# Patient Record
Sex: FEMALE | Race: OTHER
[De-identification: names, ages, dates, MRNs, and addresses within clinical notes are randomized per-mention and may not be internally consistent; named-entity substitution may affect disease eponyms.]

---

## 2020-06-23 ENCOUNTER — HOSPITAL ENCOUNTER (OUTPATIENT)
Dept: HOSPITAL 74 - JER | Age: 25
Setting detail: OBSERVATION
LOS: 4 days | Discharge: ANOTHER HOSPITAL | End: 2020-06-27
Attending: INTERNAL MEDICINE | Admitting: INTERNAL MEDICINE
Payer: COMMERCIAL

## 2020-06-23 VITALS — BODY MASS INDEX: 22.3 KG/M2

## 2020-06-23 DIAGNOSIS — Z91.19: ICD-10-CM

## 2020-06-23 DIAGNOSIS — Z72.0: ICD-10-CM

## 2020-06-23 DIAGNOSIS — F11.23: ICD-10-CM

## 2020-06-23 DIAGNOSIS — D72.829: ICD-10-CM

## 2020-06-23 DIAGNOSIS — Z29.9: ICD-10-CM

## 2020-06-23 DIAGNOSIS — I16.0: Primary | ICD-10-CM

## 2020-06-23 DIAGNOSIS — F19.10: ICD-10-CM

## 2020-06-23 PROCEDURE — U0003 INFECTIOUS AGENT DETECTION BY NUCLEIC ACID (DNA OR RNA); SEVERE ACUTE RESPIRATORY SYNDROME CORONAVIRUS 2 (SARS-COV-2) (CORONAVIRUS DISEASE [COVID-19]), AMPLIFIED PROBE TECHNIQUE, MAKING USE OF HIGH THROUGHPUT TECHNOLOGIES AS DESCRIBED BY CMS-2020-01-R: HCPCS

## 2020-06-23 PROCEDURE — G0378 HOSPITAL OBSERVATION PER HR: HCPCS

## 2020-06-24 LAB
ALBUMIN SERPL-MCNC: 4.4 G/DL (ref 3.4–5)
ALBUMIN SERPL-MCNC: 4.9 G/DL (ref 3.4–5)
ALP SERPL-CCNC: 112 U/L (ref 45–117)
ALP SERPL-CCNC: 127 U/L (ref 45–117)
ALT SERPL-CCNC: 19 U/L (ref 13–61)
ALT SERPL-CCNC: 19 U/L (ref 13–61)
ANION GAP SERPL CALC-SCNC: 12 MMOL/L (ref 8–16)
ANION GAP SERPL CALC-SCNC: 13 MMOL/L (ref 8–16)
AST SERPL-CCNC: 12 U/L (ref 15–37)
AST SERPL-CCNC: 16 U/L (ref 15–37)
BASOPHILS # BLD: 0.2 % (ref 0–2)
BASOPHILS # BLD: 0.2 % (ref 0–2)
BILIRUB SERPL-MCNC: 0.7 MG/DL (ref 0.2–1)
BILIRUB SERPL-MCNC: 0.7 MG/DL (ref 0.2–1)
BUN SERPL-MCNC: 5.6 MG/DL (ref 7–18)
BUN SERPL-MCNC: 6.1 MG/DL (ref 7–18)
CALCIUM SERPL-MCNC: 10 MG/DL (ref 8.5–10.1)
CALCIUM SERPL-MCNC: 9.2 MG/DL (ref 8.5–10.1)
CHLORIDE SERPL-SCNC: 94 MMOL/L (ref 98–107)
CHLORIDE SERPL-SCNC: 98 MMOL/L (ref 98–107)
CHOLEST SERPL-MCNC: 201 MG/DL (ref 50–200)
CO2 SERPL-SCNC: 23 MMOL/L (ref 21–32)
CO2 SERPL-SCNC: 26 MMOL/L (ref 21–32)
CREAT SERPL-MCNC: 0.8 MG/DL (ref 0.55–1.3)
CREAT SERPL-MCNC: 0.8 MG/DL (ref 0.55–1.3)
DEPRECATED RDW RBC AUTO: 14.8 % (ref 11.6–15.6)
DEPRECATED RDW RBC AUTO: 14.8 % (ref 11.6–15.6)
EOSINOPHIL # BLD: 0 % (ref 0–4.5)
EOSINOPHIL # BLD: 0 % (ref 0–4.5)
GLUCOSE SERPL-MCNC: 119 MG/DL (ref 74–106)
GLUCOSE SERPL-MCNC: 151 MG/DL (ref 74–106)
HCT VFR BLD CALC: 41.8 % (ref 32.4–45.2)
HCT VFR BLD CALC: 46.5 % (ref 32.4–45.2)
HDLC SERPL-MCNC: 80 MG/DL (ref 40–60)
HGB BLD-MCNC: 13.9 GM/DL (ref 10.7–15.3)
HGB BLD-MCNC: 15.6 GM/DL (ref 10.7–15.3)
HIV 1+2 AB+HIV1 P24 AG SERPL QL IA: NEGATIVE
LDLC SERPL CALC-MCNC: 110 MG/DL (ref 5–100)
LIPASE SERPL-CCNC: 86 U/L (ref 73–393)
LYMPHOCYTES # BLD: 10.2 % (ref 8–40)
LYMPHOCYTES # BLD: 10.4 % (ref 8–40)
MAGNESIUM SERPL-MCNC: 2.1 MG/DL (ref 1.8–2.4)
MCH RBC QN AUTO: 28 PG (ref 25.7–33.7)
MCH RBC QN AUTO: 28.3 PG (ref 25.7–33.7)
MCHC RBC AUTO-ENTMCNC: 33.3 G/DL (ref 32–36)
MCHC RBC AUTO-ENTMCNC: 33.5 G/DL (ref 32–36)
MCV RBC: 84.1 FL (ref 80–96)
MCV RBC: 84.5 FL (ref 80–96)
MONOCYTES # BLD AUTO: 4.2 % (ref 3.8–10.2)
MONOCYTES # BLD AUTO: 6.9 % (ref 3.8–10.2)
NEUTROPHILS # BLD: 82.7 % (ref 42.8–82.8)
NEUTROPHILS # BLD: 85.2 % (ref 42.8–82.8)
PHOSPHATE SERPL-MCNC: 3.3 MG/DL (ref 2.5–4.9)
PLATELET # BLD AUTO: 262 K/MM3 (ref 134–434)
PLATELET # BLD AUTO: 281 K/MM3 (ref 134–434)
PMV BLD: 9.4 FL (ref 7.5–11.1)
PMV BLD: 9.9 FL (ref 7.5–11.1)
POTASSIUM SERPLBLD-SCNC: 3.3 MMOL/L (ref 3.5–5.1)
POTASSIUM SERPLBLD-SCNC: 3.5 MMOL/L (ref 3.5–5.1)
PROT SERPL-MCNC: 8.1 G/DL (ref 6.4–8.2)
PROT SERPL-MCNC: 9.5 G/DL (ref 6.4–8.2)
RBC # BLD AUTO: 4.97 M/MM3 (ref 3.6–5.2)
RBC # BLD AUTO: 5.51 M/MM3 (ref 3.6–5.2)
SODIUM SERPL-SCNC: 132 MMOL/L (ref 136–145)
SODIUM SERPL-SCNC: 133 MMOL/L (ref 136–145)
TRIGL SERPL-MCNC: 62 MG/DL (ref 0–150)
WBC # BLD AUTO: 10.2 K/MM3 (ref 4–10)
WBC # BLD AUTO: 11.3 K/MM3 (ref 4–10)

## 2020-06-24 PROCEDURE — 3E0337Z INTRODUCTION OF ELECTROLYTIC AND WATER BALANCE SUBSTANCE INTO PERIPHERAL VEIN, PERCUTANEOUS APPROACH: ICD-10-PCS | Performed by: INTERNAL MEDICINE

## 2020-06-24 PROCEDURE — 3E033GC INTRODUCTION OF OTHER THERAPEUTIC SUBSTANCE INTO PERIPHERAL VEIN, PERCUTANEOUS APPROACH: ICD-10-PCS | Performed by: INTERNAL MEDICINE

## 2020-06-24 PROCEDURE — 3E023GC INTRODUCTION OF OTHER THERAPEUTIC SUBSTANCE INTO MUSCLE, PERCUTANEOUS APPROACH: ICD-10-PCS | Performed by: INTERNAL MEDICINE

## 2020-06-24 RX ADMIN — FOLIC ACID SCH MG: 1 TABLET ORAL at 11:02

## 2020-06-24 RX ADMIN — ACETAMINOPHEN PRN MG: 500 TABLET, FILM COATED ORAL at 14:31

## 2020-06-24 RX ADMIN — POTASSIUM CHLORIDE SCH: 7.46 INJECTION, SOLUTION INTRAVENOUS at 10:00

## 2020-06-24 RX ADMIN — ENOXAPARIN SODIUM SCH MG: 40 INJECTION SUBCUTANEOUS at 11:02

## 2020-06-24 RX ADMIN — MULTIVITAMIN TABLET SCH TAB: TABLET at 11:02

## 2020-06-24 RX ADMIN — Medication SCH MG: at 11:02

## 2020-06-24 RX ADMIN — POTASSIUM CHLORIDE SCH MLS/HR: 7.46 INJECTION, SOLUTION INTRAVENOUS at 08:20

## 2020-06-24 RX ADMIN — AMLODIPINE BESYLATE SCH MG: 10 TABLET ORAL at 14:31

## 2020-06-24 RX ADMIN — POTASSIUM CHLORIDE SCH: 7.46 INJECTION, SOLUTION INTRAVENOUS at 09:40

## 2020-06-24 RX ADMIN — FAMOTIDINE PRN MG: 20 TABLET ORAL at 18:38

## 2020-06-25 LAB
ALBUMIN SERPL-MCNC: 4.4 G/DL (ref 3.4–5)
ALP SERPL-CCNC: 110 U/L (ref 45–117)
ALT SERPL-CCNC: 17 U/L (ref 13–61)
ANION GAP SERPL CALC-SCNC: 9 MMOL/L (ref 8–16)
ANISOCYTOSIS BLD QL: 0
AST SERPL-CCNC: 13 U/L (ref 15–37)
BILIRUB SERPL-MCNC: 1.2 MG/DL (ref 0.2–1)
BUN SERPL-MCNC: 11.3 MG/DL (ref 7–18)
CALCIUM SERPL-MCNC: 9.1 MG/DL (ref 8.5–10.1)
CHLORIDE SERPL-SCNC: 95 MMOL/L (ref 98–107)
CO2 SERPL-SCNC: 24 MMOL/L (ref 21–32)
CREAT SERPL-MCNC: 0.9 MG/DL (ref 0.55–1.3)
DEPRECATED RDW RBC AUTO: 14.5 % (ref 11.6–15.6)
GLUCOSE SERPL-MCNC: 147 MG/DL (ref 74–106)
HCT VFR BLD CALC: 45.4 % (ref 32.4–45.2)
HGB BLD-MCNC: 14.9 GM/DL (ref 10.7–15.3)
MACROCYTES BLD QL: 0
MAGNESIUM SERPL-MCNC: 2.7 MG/DL (ref 1.8–2.4)
MCH RBC QN AUTO: 28 PG (ref 25.7–33.7)
MCHC RBC AUTO-ENTMCNC: 32.8 G/DL (ref 32–36)
MCV RBC: 85.4 FL (ref 80–96)
PHOSPHATE SERPL-MCNC: 3.2 MG/DL (ref 2.5–4.9)
PLATELET # BLD AUTO: 257 K/MM3 (ref 134–434)
PLATELET BLD QL SMEAR: NORMAL
PMV BLD: 9.4 FL (ref 7.5–11.1)
POTASSIUM SERPLBLD-SCNC: 3.2 MMOL/L (ref 3.5–5.1)
PROT SERPL-MCNC: 8.1 G/DL (ref 6.4–8.2)
RBC # BLD AUTO: 5.31 M/MM3 (ref 3.6–5.2)
SODIUM SERPL-SCNC: 128 MMOL/L (ref 136–145)
WBC # BLD AUTO: 12.9 K/MM3 (ref 4–10)

## 2020-06-25 RX ADMIN — ACETAMINOPHEN PRN MG: 500 TABLET, FILM COATED ORAL at 17:56

## 2020-06-25 RX ADMIN — SODIUM CHLORIDE SCH MLS/HR: 9 INJECTION, SOLUTION INTRAVENOUS at 09:23

## 2020-06-25 RX ADMIN — ONDANSETRON PRN MG: 2 INJECTION INTRAMUSCULAR; INTRAVENOUS at 06:39

## 2020-06-25 RX ADMIN — ENOXAPARIN SODIUM SCH MG: 40 INJECTION SUBCUTANEOUS at 10:45

## 2020-06-25 RX ADMIN — Medication SCH MG: at 11:25

## 2020-06-25 RX ADMIN — HYDRALAZINE HYDROCHLORIDE SCH MG: 25 TABLET, FILM COATED ORAL at 22:13

## 2020-06-25 RX ADMIN — FAMOTIDINE PRN MG: 20 TABLET ORAL at 02:29

## 2020-06-25 RX ADMIN — MULTIVITAMIN TABLET SCH TAB: TABLET at 11:25

## 2020-06-25 RX ADMIN — Medication SCH MG: at 22:05

## 2020-06-25 RX ADMIN — ACETAMINOPHEN PRN MG: 500 TABLET, FILM COATED ORAL at 02:29

## 2020-06-25 RX ADMIN — LISINOPRIL SCH MG: 20 TABLET ORAL at 15:52

## 2020-06-25 RX ADMIN — AMLODIPINE BESYLATE SCH MG: 10 TABLET ORAL at 10:45

## 2020-06-25 RX ADMIN — FOLIC ACID SCH MG: 1 TABLET ORAL at 10:45

## 2020-06-25 RX ADMIN — ONDANSETRON PRN MG: 2 INJECTION INTRAMUSCULAR; INTRAVENOUS at 22:50

## 2020-06-26 LAB
ALBUMIN SERPL-MCNC: 4.1 G/DL (ref 3.4–5)
ALP SERPL-CCNC: 102 U/L (ref 45–117)
ALT SERPL-CCNC: 15 U/L (ref 13–61)
AMPHET UR-MCNC: NEGATIVE NG/ML
ANION GAP SERPL CALC-SCNC: 11 MMOL/L (ref 8–16)
APPEARANCE UR: (no result)
AST SERPL-CCNC: 10 U/L (ref 15–37)
BARBITURATES UR-MCNC: NEGATIVE NG/ML
BASOPHILS # BLD: 0.4 % (ref 0–2)
BENZODIAZ UR SCN-MCNC: NEGATIVE NG/ML
BILIRUB SERPL-MCNC: 1.5 MG/DL (ref 0.2–1)
BILIRUB UR STRIP.AUTO-MCNC: NEGATIVE MG/DL
BUN SERPL-MCNC: 8.9 MG/DL (ref 7–18)
CALCIUM SERPL-MCNC: 8.6 MG/DL (ref 8.5–10.1)
CHLORIDE SERPL-SCNC: 98 MMOL/L (ref 98–107)
CO2 SERPL-SCNC: 22 MMOL/L (ref 21–32)
COCAINE UR-MCNC: NEGATIVE NG/ML
COLOR UR: YELLOW
CREAT SERPL-MCNC: 0.7 MG/DL (ref 0.55–1.3)
DEPRECATED RDW RBC AUTO: 14.6 % (ref 11.6–15.6)
EOSINOPHIL # BLD: 0.1 % (ref 0–4.5)
GLUCOSE SERPL-MCNC: 108 MG/DL (ref 74–106)
HCT VFR BLD CALC: 43.7 % (ref 32.4–45.2)
HGB BLD-MCNC: 14.6 GM/DL (ref 10.7–15.3)
KETONES UR QL STRIP: (no result)
LEUKOCYTE ESTERASE UR QL STRIP.AUTO: NEGATIVE
LYMPHOCYTES # BLD: 21.9 % (ref 8–40)
MAGNESIUM SERPL-MCNC: 2.4 MG/DL (ref 1.8–2.4)
MCH RBC QN AUTO: 28.4 PG (ref 25.7–33.7)
MCHC RBC AUTO-ENTMCNC: 33.5 G/DL (ref 32–36)
MCV RBC: 84.7 FL (ref 80–96)
METHADONE UR-MCNC: POSITIVE NG/ML
MONOCYTES # BLD AUTO: 9 % (ref 3.8–10.2)
NEUTROPHILS # BLD: 68.6 % (ref 42.8–82.8)
NITRITE UR QL STRIP: NEGATIVE
OPIATES UR QL SCN: NEGATIVE NG/ML
PCP UR QL SCN: NEGATIVE NG/ML
PH UR: 7.5 [PH] (ref 5–8)
PHOSPHATE SERPL-MCNC: 2.4 MG/DL (ref 2.5–4.9)
PLATELET # BLD AUTO: 242 K/MM3 (ref 134–434)
PMV BLD: 9.4 FL (ref 7.5–11.1)
POTASSIUM SERPLBLD-SCNC: 3.7 MMOL/L (ref 3.5–5.1)
PROT SERPL-MCNC: 7.4 G/DL (ref 6.4–8.2)
PROT UR QL STRIP: NEGATIVE
PROT UR QL STRIP: NEGATIVE
RBC # BLD AUTO: 5.15 M/MM3 (ref 3.6–5.2)
SODIUM SERPL-SCNC: 131 MMOL/L (ref 136–145)
SP GR UR: 1.01 (ref 1.01–1.03)
UROBILINOGEN UR STRIP-MCNC: 1 MG/DL (ref 0.2–1)
WBC # BLD AUTO: 10.4 K/MM3 (ref 4–10)

## 2020-06-26 RX ADMIN — FOLIC ACID SCH MG: 1 TABLET ORAL at 10:17

## 2020-06-26 RX ADMIN — HYDROXYZINE PAMOATE PRN MG: 25 CAPSULE ORAL at 10:24

## 2020-06-26 RX ADMIN — Medication SCH MG: at 10:18

## 2020-06-26 RX ADMIN — ENOXAPARIN SODIUM SCH MG: 40 INJECTION SUBCUTANEOUS at 10:17

## 2020-06-26 RX ADMIN — LISINOPRIL SCH MG: 20 TABLET ORAL at 10:17

## 2020-06-26 RX ADMIN — HYDROXYZINE PAMOATE PRN MG: 25 CAPSULE ORAL at 18:29

## 2020-06-26 RX ADMIN — AMLODIPINE BESYLATE SCH MG: 10 TABLET ORAL at 10:17

## 2020-06-26 RX ADMIN — HYDROXYZINE PAMOATE PRN MG: 25 CAPSULE ORAL at 00:23

## 2020-06-26 RX ADMIN — SODIUM CHLORIDE SCH: 9 INJECTION, SOLUTION INTRAVENOUS at 10:24

## 2020-06-26 RX ADMIN — MULTIVITAMIN TABLET SCH TAB: TABLET at 10:17

## 2020-06-26 RX ADMIN — ACETAMINOPHEN PRN MG: 500 TABLET, FILM COATED ORAL at 05:43

## 2020-06-26 RX ADMIN — HYDRALAZINE HYDROCHLORIDE SCH MG: 25 TABLET, FILM COATED ORAL at 14:04

## 2020-06-26 RX ADMIN — HYDRALAZINE HYDROCHLORIDE SCH MG: 25 TABLET, FILM COATED ORAL at 22:30

## 2020-06-26 RX ADMIN — METHOCARBAMOL PRN MG: 500 TABLET ORAL at 18:29

## 2020-06-26 RX ADMIN — Medication SCH MG: at 22:30

## 2020-06-26 RX ADMIN — HYDRALAZINE HYDROCHLORIDE SCH MG: 25 TABLET, FILM COATED ORAL at 05:36

## 2020-06-26 RX ADMIN — HYDROXYZINE PAMOATE PRN MG: 25 CAPSULE ORAL at 04:27

## 2020-06-27 ENCOUNTER — HOSPITAL ENCOUNTER (INPATIENT)
Dept: HOSPITAL 74 - YASAS | Age: 25
LOS: 2 days | Discharge: HOME | DRG: 773 | End: 2020-06-29
Attending: ALLERGY & IMMUNOLOGY | Admitting: ALLERGY & IMMUNOLOGY
Payer: COMMERCIAL

## 2020-06-27 VITALS — BODY MASS INDEX: 23.1 KG/M2

## 2020-06-27 VITALS — SYSTOLIC BLOOD PRESSURE: 101 MMHG | HEART RATE: 93 BPM | DIASTOLIC BLOOD PRESSURE: 60 MMHG

## 2020-06-27 VITALS — TEMPERATURE: 98 F

## 2020-06-27 DIAGNOSIS — R11.10: ICD-10-CM

## 2020-06-27 DIAGNOSIS — Z86.73: ICD-10-CM

## 2020-06-27 DIAGNOSIS — J45.909: ICD-10-CM

## 2020-06-27 DIAGNOSIS — F11.23: Primary | ICD-10-CM

## 2020-06-27 DIAGNOSIS — R10.9: ICD-10-CM

## 2020-06-27 DIAGNOSIS — F17.213: ICD-10-CM

## 2020-06-27 DIAGNOSIS — I10: ICD-10-CM

## 2020-06-27 LAB
ALBUMIN SERPL-MCNC: 3.8 G/DL (ref 3.4–5)
ALP SERPL-CCNC: 110 U/L (ref 45–117)
ALT SERPL-CCNC: 16 U/L (ref 13–61)
ANION GAP SERPL CALC-SCNC: 9 MMOL/L (ref 8–16)
AST SERPL-CCNC: 13 U/L (ref 15–37)
BILIRUB CONJ SERPL-MCNC: 0.2 MG/DL (ref 0–0.2)
BILIRUB SERPL-MCNC: 0.7 MG/DL (ref 0.2–1)
BUN SERPL-MCNC: 14 MG/DL (ref 7–18)
CALCIUM SERPL-MCNC: 8.7 MG/DL (ref 8.5–10.1)
CHLORIDE SERPL-SCNC: 102 MMOL/L (ref 98–107)
CO2 SERPL-SCNC: 22 MMOL/L (ref 21–32)
CREAT SERPL-MCNC: 1 MG/DL (ref 0.55–1.3)
GLUCOSE SERPL-MCNC: 89 MG/DL (ref 74–106)
POTASSIUM SERPLBLD-SCNC: 3.5 MMOL/L (ref 3.5–5.1)
PROT SERPL-MCNC: 6.9 G/DL (ref 6.4–8.2)
SODIUM SERPL-SCNC: 133 MMOL/L (ref 136–145)

## 2020-06-27 PROCEDURE — HZ2ZZZZ DETOXIFICATION SERVICES FOR SUBSTANCE ABUSE TREATMENT: ICD-10-PCS | Performed by: ALLERGY & IMMUNOLOGY

## 2020-06-27 RX ADMIN — FOLIC ACID SCH MG: 1 TABLET ORAL at 09:15

## 2020-06-27 RX ADMIN — HYDRALAZINE HYDROCHLORIDE SCH MG: 25 TABLET, FILM COATED ORAL at 06:19

## 2020-06-27 RX ADMIN — ACETAMINOPHEN PRN MG: 500 TABLET, FILM COATED ORAL at 12:57

## 2020-06-27 RX ADMIN — Medication SCH MG: at 22:22

## 2020-06-27 RX ADMIN — HYDRALAZINE HYDROCHLORIDE SCH MG: 25 TABLET, FILM COATED ORAL at 22:24

## 2020-06-27 RX ADMIN — AMLODIPINE BESYLATE SCH MG: 10 TABLET ORAL at 09:16

## 2020-06-27 RX ADMIN — ENOXAPARIN SODIUM SCH MG: 40 INJECTION SUBCUTANEOUS at 09:16

## 2020-06-27 RX ADMIN — LISINOPRIL SCH MG: 20 TABLET ORAL at 09:17

## 2020-06-27 RX ADMIN — METHOCARBAMOL PRN MG: 500 TABLET ORAL at 03:17

## 2020-06-27 RX ADMIN — Medication SCH MG: at 09:17

## 2020-06-27 RX ADMIN — MULTIVITAMIN TABLET SCH TAB: TABLET at 09:17

## 2020-06-27 RX ADMIN — METHOCARBAMOL PRN MG: 500 TABLET ORAL at 18:20
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
## 2020-06-28 LAB
ALBUMIN SERPL-MCNC: 4.2 G/DL (ref 3.4–5)
ALP SERPL-CCNC: 118 U/L (ref 45–117)
ALT SERPL-CCNC: 18 U/L (ref 13–61)
ANION GAP SERPL CALC-SCNC: 10 MMOL/L (ref 8–16)
AST SERPL-CCNC: 16 U/L (ref 15–37)
BILIRUB SERPL-MCNC: 0.7 MG/DL (ref 0.2–1)
BUN SERPL-MCNC: 11.3 MG/DL (ref 7–18)
CALCIUM SERPL-MCNC: 8.8 MG/DL (ref 8.5–10.1)
CHLORIDE SERPL-SCNC: 101 MMOL/L (ref 98–107)
CO2 SERPL-SCNC: 23 MMOL/L (ref 21–32)
CREAT SERPL-MCNC: 0.8 MG/DL (ref 0.55–1.3)
DEPRECATED RDW RBC AUTO: 14.3 % (ref 11.6–15.6)
GLUCOSE SERPL-MCNC: 123 MG/DL (ref 74–106)
HCT VFR BLD CALC: 42.8 % (ref 32.4–45.2)
HGB BLD-MCNC: 14.1 GM/DL (ref 10.7–15.3)
MCH RBC QN AUTO: 28.4 PG (ref 25.7–33.7)
MCHC RBC AUTO-ENTMCNC: 33 G/DL (ref 32–36)
MCV RBC: 86 FL (ref 80–96)
PLATELET # BLD AUTO: 280 K/MM3 (ref 134–434)
PMV BLD: 9.6 FL (ref 7.5–11.1)
POTASSIUM SERPLBLD-SCNC: 3.7 MMOL/L (ref 3.5–5.1)
PROT SERPL-MCNC: 7.4 G/DL (ref 6.4–8.2)
RBC # BLD AUTO: 4.97 M/MM3 (ref 3.6–5.2)
SODIUM SERPL-SCNC: 134 MMOL/L (ref 136–145)
WBC # BLD AUTO: 9.4 K/MM3 (ref 4–10)

## 2020-06-28 RX ADMIN — HYDRALAZINE HYDROCHLORIDE SCH MG: 25 TABLET, FILM COATED ORAL at 22:46

## 2020-06-28 RX ADMIN — FAMOTIDINE SCH: 20 TABLET ORAL at 11:38

## 2020-06-28 RX ADMIN — METHOCARBAMOL PRN MG: 500 TABLET ORAL at 21:37

## 2020-06-28 RX ADMIN — Medication SCH MG: at 22:46

## 2020-06-28 RX ADMIN — HYDRALAZINE HYDROCHLORIDE SCH: 25 TABLET, FILM COATED ORAL at 08:25

## 2020-06-28 RX ADMIN — HYDRALAZINE HYDROCHLORIDE SCH MG: 25 TABLET, FILM COATED ORAL at 13:27

## 2020-06-28 RX ADMIN — HYDRALAZINE HYDROCHLORIDE SCH: 25 TABLET, FILM COATED ORAL at 06:48

## 2020-06-28 RX ADMIN — NICOTINE SCH: 7 PATCH TRANSDERMAL at 09:30

## 2020-06-28 RX ADMIN — LISINOPRIL SCH MG: 20 TABLET ORAL at 09:07

## 2020-06-28 RX ADMIN — AMLODIPINE BESYLATE SCH MG: 10 TABLET ORAL at 09:07

## 2020-06-28 RX ADMIN — Medication SCH: at 09:30

## 2020-06-28 RX ADMIN — ACETAMINOPHEN PRN MG: 325 TABLET ORAL at 23:38

## 2020-06-28 RX ADMIN — ACETAMINOPHEN PRN MG: 325 TABLET ORAL at 02:36

## 2020-06-28 RX ADMIN — FAMOTIDINE SCH MG: 20 TABLET ORAL at 22:46
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
## 2020-06-28 NOTE — PN
[facility] COWS





- Scale


Resting Pulse: 2= -120


Sweatin= No chills or Flushing


Restless Observation: 0= Sits Still


Pupil Size: 0= Normal to Room Light


Bone or Joint Aches: 0= None


Runny Nose/ Eye Tearin= None


GI Upset > 30mins: 5=Frequent Vomit/Diarrhea


Tremor Observation of Outstretched Hands: 0= None


Yawning Observation: 0= None


Anxiety or Irritability: 0= None


Goose Flesh Skin: 0=Smooth Skin


COWS Score: 7





[facility] Progress Note (SOAP)


Subjective: 





apresoline not given around 7 am today


bp 150/98


one dose of apresoline now





25 years old female transferred from medical unit to opiate detox unit 


admitted on 20 for opiate withdrawal sx management treating with methadone

detox regiment


Ms trejo was treated at medical unit for fever abdominal pain and bp elevation 

from 20 to 20 


                               Vital Signs - 24 hr











  20





  14:09 16:36 20:50


 


Temperature 97.1 F L 97.7 F 97.8 F


 


Pulse Rate 94 H 114 H 104 H


 


Respiratory 16 16 16





Rate   


 


Blood Pressure 97/58 L 106/68 102/69


 


O2 Sat by Pulse   100





Oximetry (%)   














  20





  00:30 03:30 06:24


 


Temperature   97.6 F


 


Pulse Rate   103 H


 


Respiratory 18 18 18





Rate   


 


Blood Pressure   153/93


 


O2 Sat by Pulse   99





Oximetry (%)   














  20





  08:35


 


Temperature 97.5 F L


 


Pulse Rate 118 H


 


Respiratory 18





Rate 


 


Blood Pressure 150/98


 


O2 Sat by Pulse 





Oximetry (%) 








bp elevation 


resume apresoline amlodipin lisinopril 


discontinue motrin


pepecid 20 mg po bid initiated


carafate 1 gram x 1 around 2 pm today 


Objective: 





20 11:27


lab see Medical unit result


detox lab pending


Assessment: 





20 11:28


opiate withdrawal 


Plan: 





methadone regiment

## 2020-06-29 VITALS — SYSTOLIC BLOOD PRESSURE: 126 MMHG | DIASTOLIC BLOOD PRESSURE: 87 MMHG | TEMPERATURE: 97.5 F | HEART RATE: 108 BPM

## 2020-06-29 RX ADMIN — HYDRALAZINE HYDROCHLORIDE SCH: 25 TABLET, FILM COATED ORAL at 06:55

## 2020-06-29 RX ADMIN — FAMOTIDINE SCH MG: 20 TABLET ORAL at 09:51

## 2020-06-29 RX ADMIN — Medication SCH TAB: at 09:50

## 2020-06-29 RX ADMIN — LISINOPRIL SCH MG: 20 TABLET ORAL at 09:50

## 2020-06-29 RX ADMIN — AMLODIPINE BESYLATE SCH MG: 10 TABLET ORAL at 09:50

## 2020-06-29 RX ADMIN — NICOTINE SCH: 7 PATCH TRANSDERMAL at 09:51

## 2020-06-29 NOTE — DS
[facility] Detox Discharge Summary


Admission Date: 


06/27/20





Discharge Date: 06/29/20





- History


Present History: Opioid Dependence


Additional Comments: 





25 years old female admitted on 06/27/20 for opiate withdrawal sx management 

treated with methadone detox regiment 


ms trejo does not wish to be seen by a psychiatrist "they are not giving me 

anything"


ms trejo was treated at medical unit for abdominal pain and vomiting from 06/23/20

to 06/27/20


medically stabled to [facility] for opiate withdrawal sx management


ms trejo has completed the methadone regiment and is tolerated well


ms trejo experienced abdominal pain and vomiting throughout the opiate detox 


toradol for abdominal pain management and tigan IM for vomiting


ms trejo is alert oriented x 3 ambulating steady gaits from room to nurse station 

to counselor's room 


                               Vital Signs - 24 hr











  06/28/20 06/28/20 06/28/20





  12:40 16:50 20:59


 


Temperature 97.7 F 97.5 F L 99.5 F


 


Pulse Rate 104 H 104 H 123 H


 


Respiratory 18 16 16





Rate   


 


Blood Pressure 157/95 127/70 157/94


 


O2 Sat by Pulse 100  96





Oximetry (%)   














  06/28/20 06/29/20 06/29/20





  22:40 05:55 08:42


 


Temperature 96.9 F L 98.0 F 97.5 F L


 


Pulse Rate  96 H 108 H


 


Respiratory  18 16





Rate   


 


Blood Pressure  100/69 126/87


 


O2 Sat by Pulse  98 





Oximetry (%)   








denies dizziness denies  palpitation 


oral mucosa pink moist speech clearly coherently 


ms prefers to stay in detox or transferred to [facility] continue toradol for 

pain and tigan for vomiting


suggesting return to medical unit for abdominal pain and vomiting re evaluation 


ms trejo prefers to go to opiate recovery rehab facility today 


counselor presents an opportunity of [facility] in patient opiate recovery 


ms trejo agrees to go to [facility] transportation around 1030 today





ms trejo presents that [facility] is better place for her


treatment team met with the patient discussing continuity of care as key for 

opiate recovery regardless [facility]





cardiac s1s2  sinus tachycardia


ekg indicated biatrial enlargement


ms denies chest pain no dizziness no shortness of breath 


discussing smoking cessation 


respiratory clear lung sounds bilaterally on auscultation


extremities full range of motion 





Pertinent Past History: 





time for discharge 58 minutes








- Physical Exam Results


Vital Signs: 


                                   Vital Signs











Temperature  97.5 F L  06/29/20 08:42


 


Pulse Rate  108 H  06/29/20 08:42


 


Respiratory Rate  16   06/29/20 08:42


 


Blood Pressure  126/87   06/29/20 08:42


 


O2 Sat by Pulse Oximetry (%)  98   06/29/20 05:55











Pertinent Admission Physical Exam Findings: 





opiate withdrawal 


                                Laboratory Tests











  06/28/20 06/28/20





  07:30 07:30


 


WBC  9.4 


 


RBC  4.97 


 


Hgb  14.1 


 


Hct  42.8 


 


MCV  86.0 


 


MCH  28.4 


 


MCHC  33.0 


 


RDW  14.3 


 


Plt Count  280 


 


MPV  9.6 


 


Sodium   134 L


 


Potassium   3.7


 


Chloride   101


 


Carbon Dioxide   23


 


Anion Gap   10


 


BUN   11.3


 


Creatinine   0.8


 


Est GFR (CKD-EPI)AfAm   117.93


 


Est GFR (CKD-EPI)NonAf   101.75


 


Random Glucose   123 H


 


Calcium   8.8


 


Total Bilirubin   0.7


 


AST   16


 


ALT   18


 


Alkaline Phosphatase   118 H


 


Total Protein   7.4


 


Albumin   4.2














- Treatment


Hospital Course: Detox Protocol Followed, Detoxed Safely (prolong qtc tolerted 

methadone well throughout the detox process), Responded well (discussing 

medication assisted treatment program  picking up narcan from pharmacy upon 

discharge from in patient substance abuse facility), Discharged Condition Good 

(less nausea vomiting less frequent abdomen pain  ms trejo prefers to stay in 

detox  [facility] for [facility] 

[facility]), Rehab Referral Accepted (suggesting ms trejo return to ER for further 

abdominal pain and nausea vomiting re evaluation   ms trejo prefers to stay in [facility] continue toradol treatment  ms trejo agrees to go to [facility] 

inpatient opiate rehab transportation  around 1030 am today ms trejo voice 

aftercare preference to [facility] discussing the continuity of care as opiate

abuse recovery)


[facility]





- Medication


Discharge Medications: 


Ambulatory Orders





Acetaminophen [Tylenol .Extra-Strength -] 1,000 mg PO Q6H PRN  tablet 06/26/20 


Amlodipine Besylate [Norvasc -] 10 mg PO DAILY  tablet 06/26/20 


Bismuth Subsalicylate [Pepto-Bismol -] 524 mg PO Q1H PRN  ud 06/26/20 


Famotidine [Pepcid -] 20 mg PO DAILY PRN  tablet 06/26/20 


Folic Acid - 1 mg PO DAILY  tablet 06/26/20 


Ibuprofen [Motrin -] 400 mg PO Q6H PRN  tablet 06/26/20 


Lisinopril [Prinivil] 40 mg PO DAILY  tablet 06/26/20 


Mag Hydrox/Al Hydrox/Simeth [Mylanta Oral Suspension -] 30 ml PO Q6H PRN  cup 

06/26/20 


Magnesium Citrate [Citroma -] 300 ml PO Q48H PRN  bottle 06/26/20 


Magnesium Hydrox 2400MG/30Ml [Milk of Magnesia -] 30 ml PO PRN PRN  cup 06/26/20




Melatonin 5 mg PO HS  tab 06/26/20 


Menthol/Phenol [Cepastat Lozenge -] 1 each MM Q4H PRN  ud 06/26/20 


Methocarbamol [Robaxin -] 500 mg PO Q6H PRN  tablet 06/26/20 


Multivitamins [Multivit ([facility] Formulary)] 1 tab PO DAILY  tab 06/26/20 


Prenatal Vitamins ([facility]) - 1 tab PO DAILY  tablet 06/26/20 


Thiamine HCl [Vitamin B1 -] 100 mg PO DAILY  tablet 06/26/20 


hydrALAZINE HCL [Apresoline -] 25 mg PO TID  tablet 06/26/20 


hydrOXYzine PAMOATE [Vistaril -] 25 mg PO Q4HWA  capsule 06/26/20 


Naloxone HCl [Narcan] 4 mg NS ASDIR PRN #1 spray 06/28/20 











- Diagnosis


(1) Abdominal pain with vomiting


Status: Chronic   





(2) HTN (hypertension)


Status: Chronic   


Qualifiers: 


   Hypertension type: essential hypertension   Qualified Code(s): I10 - 

Essential (primary) hypertension   





(3) Heroin withdrawal


Status: Acute   





(4) Nicotine addiction


Status: Acute   


Qualifiers: 


   Nicotine product type: cigarettes   Substance use status: in withdrawal   

Qualified Code(s): F17.213 - Nicotine dependence, cigarettes, with withdrawal   





- AMA


Did Patient Leave Against Medical Advice: No

## 2021-03-18 ENCOUNTER — HOSPITAL ENCOUNTER (EMERGENCY)
Dept: HOSPITAL 74 - JER | Age: 26
Discharge: ANOTHER HOSPITAL | End: 2021-03-18
Payer: COMMERCIAL

## 2021-03-18 ENCOUNTER — HOSPITAL ENCOUNTER (INPATIENT)
Dept: HOSPITAL 74 - YASAS | Age: 26
LOS: 5 days | Discharge: OTHER INSTITUTION | DRG: 773 | End: 2021-03-23
Attending: ALLERGY & IMMUNOLOGY | Admitting: ALLERGY & IMMUNOLOGY
Payer: COMMERCIAL

## 2021-03-18 VITALS — BODY MASS INDEX: 23.1 KG/M2

## 2021-03-18 VITALS — TEMPERATURE: 98.9 F | DIASTOLIC BLOOD PRESSURE: 105 MMHG | HEART RATE: 90 BPM | SYSTOLIC BLOOD PRESSURE: 173 MMHG

## 2021-03-18 VITALS — BODY MASS INDEX: 22.4 KG/M2

## 2021-03-18 DIAGNOSIS — Z56.0: ICD-10-CM

## 2021-03-18 DIAGNOSIS — F31.9: ICD-10-CM

## 2021-03-18 DIAGNOSIS — J45.909: ICD-10-CM

## 2021-03-18 DIAGNOSIS — I10: ICD-10-CM

## 2021-03-18 DIAGNOSIS — E86.0: Primary | ICD-10-CM

## 2021-03-18 DIAGNOSIS — R00.0: ICD-10-CM

## 2021-03-18 DIAGNOSIS — F41.9: ICD-10-CM

## 2021-03-18 DIAGNOSIS — R42: ICD-10-CM

## 2021-03-18 DIAGNOSIS — R07.89: ICD-10-CM

## 2021-03-18 DIAGNOSIS — F11.23: Primary | ICD-10-CM

## 2021-03-18 DIAGNOSIS — D72.829: ICD-10-CM

## 2021-03-18 DIAGNOSIS — F17.210: ICD-10-CM

## 2021-03-18 DIAGNOSIS — E87.6: ICD-10-CM

## 2021-03-18 DIAGNOSIS — F11.90: ICD-10-CM

## 2021-03-18 DIAGNOSIS — Z91.410: ICD-10-CM

## 2021-03-18 DIAGNOSIS — F25.9: ICD-10-CM

## 2021-03-18 LAB
ALBUMIN SERPL-MCNC: 5 G/DL (ref 3.4–5)
ALP SERPL-CCNC: 151 U/L (ref 45–117)
ALT SERPL-CCNC: 18 U/L (ref 13–61)
ANION GAP SERPL CALC-SCNC: 12 MMOL/L (ref 8–16)
APPEARANCE UR: (no result)
AST SERPL-CCNC: 8 U/L (ref 15–37)
BACTERIA # UR AUTO: 2944.4 /UL (ref 0–1359)
BASOPHILS # BLD: 0.4 % (ref 0–2)
BILIRUB SERPL-MCNC: 0.7 MG/DL (ref 0.2–1)
BILIRUB UR STRIP.AUTO-MCNC: NEGATIVE MG/DL
BUN SERPL-MCNC: 8.2 MG/DL (ref 7–18)
CALCIUM SERPL-MCNC: 10.5 MG/DL (ref 8.5–10.1)
CASTS URNS QL MICRO: 4.88 /UL (ref 0–3.1)
CHLORIDE SERPL-SCNC: 95 MMOL/L (ref 98–107)
CO2 SERPL-SCNC: 30 MMOL/L (ref 21–32)
COLOR UR: YELLOW
CREAT SERPL-MCNC: 0.8 MG/DL (ref 0.55–1.3)
DEPRECATED RDW RBC AUTO: 14.1 % (ref 11.6–15.6)
EOSINOPHIL # BLD: 0 % (ref 0–4.5)
EPITH CASTS URNS QL MICRO: 117.2 /UL (ref 0–25.1)
GLUCOSE SERPL-MCNC: 139 MG/DL (ref 74–106)
HCT VFR BLD CALC: 45.5 % (ref 32.4–45.2)
HGB BLD-MCNC: 15.4 GM/DL (ref 10.7–15.3)
KETONES UR QL STRIP: (no result)
LEUKOCYTE ESTERASE UR QL STRIP.AUTO: NEGATIVE
LYMPHOCYTES # BLD: 8.6 % (ref 8–40)
MAGNESIUM SERPL-MCNC: 2.8 MG/DL (ref 1.8–2.4)
MCH RBC QN AUTO: 28 PG (ref 25.7–33.7)
MCHC RBC AUTO-ENTMCNC: 33.8 G/DL (ref 32–36)
MCV RBC: 82.9 FL (ref 80–96)
MONOCYTES # BLD AUTO: 4.4 % (ref 3.8–10.2)
NEUTROPHILS # BLD: 86.6 % (ref 42.8–82.8)
NITRITE UR QL STRIP: NEGATIVE
PH UR: 6.5 [PH] (ref 5–8)
PLATELET # BLD AUTO: 340 K/MM3 (ref 134–434)
PMV BLD: 9 FL (ref 7.5–11.1)
POTASSIUM SERPLBLD-SCNC: 3.1 MMOL/L (ref 3.5–5.1)
PROT SERPL-MCNC: 9.8 G/DL (ref 6.4–8.2)
PROT UR QL STRIP: (no result)
PROT UR QL STRIP: NEGATIVE
RBC # BLD AUTO: 23.8 /UL (ref 0–23.9)
RBC # BLD AUTO: 5.49 M/MM3 (ref 3.6–5.2)
SODIUM SERPL-SCNC: 137 MMOL/L (ref 136–145)
SP GR UR: 1.03 (ref 1.01–1.03)
UROBILINOGEN UR STRIP-MCNC: 0.2 MG/DL (ref 0.2–1)
WBC # BLD AUTO: 13.8 K/MM3 (ref 4–10)
WBC # UR AUTO: 140.2 /UL (ref 0–25.8)

## 2021-03-18 PROCEDURE — HZ2ZZZZ DETOXIFICATION SERVICES FOR SUBSTANCE ABUSE TREATMENT: ICD-10-PCS | Performed by: ALLERGY & IMMUNOLOGY

## 2021-03-18 PROCEDURE — C9803 HOPD COVID-19 SPEC COLLECT: HCPCS

## 2021-03-18 PROCEDURE — U0003 INFECTIOUS AGENT DETECTION BY NUCLEIC ACID (DNA OR RNA); SEVERE ACUTE RESPIRATORY SYNDROME CORONAVIRUS 2 (SARS-COV-2) (CORONAVIRUS DISEASE [COVID-19]), AMPLIFIED PROBE TECHNIQUE, MAKING USE OF HIGH THROUGHPUT TECHNOLOGIES AS DESCRIBED BY CMS-2020-01-R: HCPCS

## 2021-03-18 RX ADMIN — HYDROXYZINE PAMOATE SCH MG: 25 CAPSULE ORAL at 17:09

## 2021-03-18 RX ADMIN — HYDROXYZINE PAMOATE SCH MG: 25 CAPSULE ORAL at 21:01

## 2021-03-18 RX ADMIN — NICOTINE SCH: 14 PATCH, EXTENDED RELEASE TRANSDERMAL at 18:16

## 2021-03-18 RX ADMIN — Medication SCH MG: at 21:01

## 2021-03-18 RX ADMIN — Medication SCH: at 18:16

## 2021-03-18 RX ADMIN — METHOCARBAMOL PRN MG: 500 TABLET ORAL at 17:09

## 2021-03-18 RX ADMIN — HYDRALAZINE HYDROCHLORIDE SCH MG: 25 TABLET, FILM COATED ORAL at 22:05

## 2021-03-18 SDOH — ECONOMIC STABILITY - INCOME SECURITY: UNEMPLOYMENT, UNSPECIFIED: Z56.0

## 2021-03-19 RX ADMIN — IBUPROFEN PRN MG: 400 TABLET, FILM COATED ORAL at 07:23

## 2021-03-19 RX ADMIN — HYDROXYZINE PAMOATE SCH MG: 25 CAPSULE ORAL at 18:36

## 2021-03-19 RX ADMIN — HYDROXYZINE PAMOATE SCH MG: 25 CAPSULE ORAL at 05:42

## 2021-03-19 RX ADMIN — HYDROXYZINE PAMOATE SCH MG: 25 CAPSULE ORAL at 22:14

## 2021-03-19 RX ADMIN — HYDRALAZINE HYDROCHLORIDE SCH MG: 25 TABLET, FILM COATED ORAL at 05:42

## 2021-03-19 RX ADMIN — METHOCARBAMOL PRN MG: 500 TABLET ORAL at 09:21

## 2021-03-19 RX ADMIN — ONDANSETRON PRN MG: 4 TABLET, ORALLY DISINTEGRATING ORAL at 14:28

## 2021-03-19 RX ADMIN — ONDANSETRON PRN MG: 4 TABLET, ORALLY DISINTEGRATING ORAL at 03:56

## 2021-03-19 RX ADMIN — AMLODIPINE BESYLATE SCH MG: 10 TABLET ORAL at 09:20

## 2021-03-19 RX ADMIN — HYDROXYZINE PAMOATE SCH MG: 25 CAPSULE ORAL at 14:15

## 2021-03-19 RX ADMIN — METHOCARBAMOL PRN MG: 500 TABLET ORAL at 19:59

## 2021-03-19 RX ADMIN — NICOTINE SCH MG: 14 PATCH, EXTENDED RELEASE TRANSDERMAL at 09:21

## 2021-03-19 RX ADMIN — LISINOPRIL SCH MG: 20 TABLET ORAL at 10:40

## 2021-03-19 RX ADMIN — Medication SCH MG: at 22:14

## 2021-03-19 RX ADMIN — Medication SCH TAB: at 09:20

## 2021-03-19 RX ADMIN — HYDRALAZINE HYDROCHLORIDE SCH MG: 25 TABLET, FILM COATED ORAL at 22:14

## 2021-03-19 RX ADMIN — IBUPROFEN PRN MG: 400 TABLET, FILM COATED ORAL at 14:15

## 2021-03-19 RX ADMIN — HYDRALAZINE HYDROCHLORIDE SCH: 25 TABLET, FILM COATED ORAL at 14:24

## 2021-03-19 RX ADMIN — METHOCARBAMOL PRN MG: 500 TABLET ORAL at 14:56

## 2021-03-19 RX ADMIN — HYDROXYZINE PAMOATE SCH MG: 25 CAPSULE ORAL at 09:20

## 2021-03-20 RX ADMIN — AMLODIPINE BESYLATE SCH MG: 10 TABLET ORAL at 10:15

## 2021-03-20 RX ADMIN — LIDOCAINE SCH PATCH: 50 PATCH TOPICAL at 15:33

## 2021-03-20 RX ADMIN — HYDRALAZINE HYDROCHLORIDE SCH MG: 25 TABLET, FILM COATED ORAL at 14:28

## 2021-03-20 RX ADMIN — HYDROXYZINE PAMOATE SCH MG: 25 CAPSULE ORAL at 14:29

## 2021-03-20 RX ADMIN — IBUPROFEN PRN MG: 400 TABLET, FILM COATED ORAL at 13:07

## 2021-03-20 RX ADMIN — HYDROXYZINE PAMOATE SCH MG: 25 CAPSULE ORAL at 23:13

## 2021-03-20 RX ADMIN — Medication SCH MG: at 23:13

## 2021-03-20 RX ADMIN — HYDRALAZINE HYDROCHLORIDE SCH MG: 25 TABLET, FILM COATED ORAL at 23:12

## 2021-03-20 RX ADMIN — LISINOPRIL SCH MG: 20 TABLET ORAL at 10:15

## 2021-03-20 RX ADMIN — NICOTINE SCH MG: 14 PATCH, EXTENDED RELEASE TRANSDERMAL at 10:15

## 2021-03-20 RX ADMIN — HYDRALAZINE HYDROCHLORIDE SCH: 25 TABLET, FILM COATED ORAL at 16:18

## 2021-03-20 RX ADMIN — HYDROXYZINE PAMOATE SCH MG: 25 CAPSULE ORAL at 10:16

## 2021-03-20 RX ADMIN — HYDROXYZINE PAMOATE SCH MG: 25 CAPSULE ORAL at 05:32

## 2021-03-20 RX ADMIN — Medication SCH TAB: at 10:14

## 2021-03-20 RX ADMIN — HYDROXYZINE PAMOATE SCH: 25 CAPSULE ORAL at 19:34

## 2021-03-20 RX ADMIN — Medication SCH EACH: at 23:13

## 2021-03-20 RX ADMIN — METHOCARBAMOL PRN MG: 500 TABLET ORAL at 19:22

## 2021-03-20 RX ADMIN — TRIMETHOBENZAMIDE HYDROCHLORIDE PRN MG: 100 INJECTION INTRAMUSCULAR at 07:34

## 2021-03-20 RX ADMIN — ONDANSETRON PRN MG: 4 TABLET, ORALLY DISINTEGRATING ORAL at 01:07

## 2021-03-20 RX ADMIN — TRIMETHOBENZAMIDE HYDROCHLORIDE PRN MG: 100 INJECTION INTRAMUSCULAR at 17:26

## 2021-03-20 RX ADMIN — Medication SCH MG: at 23:14

## 2021-03-21 LAB
BASOPHILS # BLD: 0.3 % (ref 0–2)
DEPRECATED RDW RBC AUTO: 14.2 % (ref 11.6–15.6)
EOSINOPHIL # BLD: 0.5 % (ref 0–4.5)
HCT VFR BLD CALC: 47.3 % (ref 32.4–45.2)
HGB BLD-MCNC: 15.7 GM/DL (ref 10.7–15.3)
LYMPHOCYTES # BLD: 44.3 % (ref 8–40)
MCH RBC QN AUTO: 28 PG (ref 25.7–33.7)
MCHC RBC AUTO-ENTMCNC: 33.2 G/DL (ref 32–36)
MCV RBC: 84.4 FL (ref 80–96)
MONOCYTES # BLD AUTO: 7.1 % (ref 3.8–10.2)
NEUTROPHILS # BLD: 47.8 % (ref 42.8–82.8)
PLATELET # BLD AUTO: 337 K/MM3 (ref 134–434)
PMV BLD: 9.5 FL (ref 7.5–11.1)
RBC # BLD AUTO: 5.61 M/MM3 (ref 3.6–5.2)
WBC # BLD AUTO: 9.5 K/MM3 (ref 4–10)

## 2021-03-21 RX ADMIN — Medication SCH: at 23:34

## 2021-03-21 RX ADMIN — ATENOLOL SCH MG: 25 TABLET ORAL at 15:04

## 2021-03-21 RX ADMIN — ONDANSETRON PRN MG: 4 TABLET, ORALLY DISINTEGRATING ORAL at 17:37

## 2021-03-21 RX ADMIN — HYDRALAZINE HYDROCHLORIDE SCH MG: 25 TABLET, FILM COATED ORAL at 07:07

## 2021-03-21 RX ADMIN — HYDROXYZINE PAMOATE SCH MG: 25 CAPSULE ORAL at 10:12

## 2021-03-21 RX ADMIN — Medication SCH EACH: at 22:54

## 2021-03-21 RX ADMIN — Medication SCH MG: at 22:57

## 2021-03-21 RX ADMIN — NICOTINE SCH: 14 PATCH, EXTENDED RELEASE TRANSDERMAL at 10:11

## 2021-03-21 RX ADMIN — ATENOLOL SCH MG: 25 TABLET ORAL at 15:41

## 2021-03-21 RX ADMIN — LISINOPRIL SCH MG: 20 TABLET ORAL at 10:12

## 2021-03-21 RX ADMIN — HYDROXYZINE PAMOATE SCH MG: 25 CAPSULE ORAL at 07:12

## 2021-03-21 RX ADMIN — LIDOCAINE SCH PATCH: 50 PATCH TOPICAL at 10:11

## 2021-03-21 RX ADMIN — FAMOTIDINE SCH MG: 20 TABLET ORAL at 10:12

## 2021-03-21 RX ADMIN — TRIMETHOBENZAMIDE HYDROCHLORIDE PRN MG: 100 INJECTION INTRAMUSCULAR at 19:25

## 2021-03-21 RX ADMIN — HYDROXYZINE PAMOATE SCH MG: 25 CAPSULE ORAL at 22:56

## 2021-03-21 RX ADMIN — HYDROXYZINE PAMOATE SCH: 25 CAPSULE ORAL at 15:19

## 2021-03-21 RX ADMIN — HYDRALAZINE HYDROCHLORIDE SCH MG: 25 TABLET, FILM COATED ORAL at 22:55

## 2021-03-21 RX ADMIN — FAMOTIDINE SCH MG: 20 TABLET ORAL at 22:55

## 2021-03-21 RX ADMIN — ATENOLOL SCH MG: 25 TABLET ORAL at 22:55

## 2021-03-21 RX ADMIN — HYDROXYZINE PAMOATE SCH: 25 CAPSULE ORAL at 19:16

## 2021-03-21 RX ADMIN — Medication SCH TAB: at 10:12

## 2021-03-21 RX ADMIN — HYDRALAZINE HYDROCHLORIDE SCH MG: 25 TABLET, FILM COATED ORAL at 15:04

## 2021-03-21 RX ADMIN — AMLODIPINE BESYLATE SCH MG: 10 TABLET ORAL at 10:12

## 2021-03-22 RX ADMIN — NICOTINE SCH: 14 PATCH, EXTENDED RELEASE TRANSDERMAL at 10:44

## 2021-03-22 RX ADMIN — ATENOLOL SCH MG: 25 TABLET ORAL at 10:44

## 2021-03-22 RX ADMIN — FAMOTIDINE SCH MG: 20 TABLET ORAL at 23:11

## 2021-03-22 RX ADMIN — HYDROXYZINE PAMOATE SCH MG: 25 CAPSULE ORAL at 14:23

## 2021-03-22 RX ADMIN — HYDROXYZINE PAMOATE SCH MG: 25 CAPSULE ORAL at 18:20

## 2021-03-22 RX ADMIN — FAMOTIDINE SCH MG: 20 TABLET ORAL at 10:44

## 2021-03-22 RX ADMIN — HYDRALAZINE HYDROCHLORIDE SCH MG: 25 TABLET, FILM COATED ORAL at 23:11

## 2021-03-22 RX ADMIN — AMLODIPINE BESYLATE SCH MG: 10 TABLET ORAL at 10:44

## 2021-03-22 RX ADMIN — Medication SCH TAB: at 10:44

## 2021-03-22 RX ADMIN — HYDRALAZINE HYDROCHLORIDE SCH MG: 25 TABLET, FILM COATED ORAL at 14:23

## 2021-03-22 RX ADMIN — HYDROXYZINE PAMOATE SCH MG: 25 CAPSULE ORAL at 07:10

## 2021-03-22 RX ADMIN — Medication SCH MG: at 23:11

## 2021-03-22 RX ADMIN — HYDROXYZINE PAMOATE SCH MG: 25 CAPSULE ORAL at 23:12

## 2021-03-22 RX ADMIN — LIDOCAINE SCH PATCH: 50 PATCH TOPICAL at 10:44

## 2021-03-22 RX ADMIN — LISINOPRIL SCH MG: 20 TABLET ORAL at 10:44

## 2021-03-22 RX ADMIN — Medication SCH EACH: at 23:12

## 2021-03-22 RX ADMIN — HYDROXYZINE PAMOATE SCH MG: 25 CAPSULE ORAL at 10:45

## 2021-03-22 RX ADMIN — ATENOLOL SCH MG: 25 TABLET ORAL at 23:11

## 2021-03-22 RX ADMIN — HYDRALAZINE HYDROCHLORIDE SCH MG: 25 TABLET, FILM COATED ORAL at 07:10

## 2021-03-23 VITALS — TEMPERATURE: 97.8 F | DIASTOLIC BLOOD PRESSURE: 84 MMHG | SYSTOLIC BLOOD PRESSURE: 149 MMHG | HEART RATE: 83 BPM

## 2021-03-23 RX ADMIN — METHOCARBAMOL PRN MG: 500 TABLET ORAL at 18:10

## 2021-03-23 RX ADMIN — ATENOLOL SCH MG: 25 TABLET ORAL at 12:33

## 2021-03-23 RX ADMIN — LISINOPRIL SCH MG: 20 TABLET ORAL at 12:30

## 2021-03-23 RX ADMIN — NICOTINE SCH: 14 PATCH, EXTENDED RELEASE TRANSDERMAL at 12:31

## 2021-03-23 RX ADMIN — Medication SCH TAB: at 12:30

## 2021-03-23 RX ADMIN — FAMOTIDINE SCH MG: 20 TABLET ORAL at 12:30

## 2021-03-23 RX ADMIN — HYDRALAZINE HYDROCHLORIDE SCH MG: 25 TABLET, FILM COATED ORAL at 07:13

## 2021-03-23 RX ADMIN — LIDOCAINE SCH: 50 PATCH TOPICAL at 12:31

## 2021-03-23 RX ADMIN — HYDROXYZINE PAMOATE SCH: 25 CAPSULE ORAL at 15:05

## 2021-03-23 RX ADMIN — HYDROXYZINE PAMOATE SCH: 25 CAPSULE ORAL at 12:37

## 2021-03-23 RX ADMIN — AMLODIPINE BESYLATE SCH MG: 10 TABLET ORAL at 12:31

## 2021-03-23 RX ADMIN — HYDROXYZINE PAMOATE SCH MG: 25 CAPSULE ORAL at 07:14

## 2021-03-23 RX ADMIN — HYDROXYZINE PAMOATE SCH MG: 25 CAPSULE ORAL at 18:10

## 2021-03-23 RX ADMIN — HYDRALAZINE HYDROCHLORIDE SCH: 25 TABLET, FILM COATED ORAL at 15:06

## 2021-03-24 ENCOUNTER — HOSPITAL ENCOUNTER (INPATIENT)
Dept: HOSPITAL 74 - YASAS | Age: 26
LOS: 1 days | Discharge: LEFT BEFORE DISCHARGE | DRG: 770 | End: 2021-03-25
Attending: ALLERGY & IMMUNOLOGY | Admitting: ALLERGY & IMMUNOLOGY
Payer: COMMERCIAL

## 2021-03-24 VITALS — BODY MASS INDEX: 21.9 KG/M2

## 2021-03-24 VITALS — TEMPERATURE: 96.8 F

## 2021-03-24 DIAGNOSIS — F32.9: ICD-10-CM

## 2021-03-24 DIAGNOSIS — I10: ICD-10-CM

## 2021-03-24 DIAGNOSIS — F11.20: Primary | ICD-10-CM

## 2021-03-24 DIAGNOSIS — F25.9: ICD-10-CM

## 2021-03-24 DIAGNOSIS — F41.9: ICD-10-CM

## 2021-03-24 DIAGNOSIS — J45.909: ICD-10-CM

## 2021-03-24 DIAGNOSIS — R63.4: ICD-10-CM

## 2021-03-24 DIAGNOSIS — F17.210: ICD-10-CM

## 2021-03-24 DIAGNOSIS — K21.9: ICD-10-CM

## 2021-03-24 PROCEDURE — C9803 HOPD COVID-19 SPEC COLLECT: HCPCS

## 2021-03-24 PROCEDURE — HZ42ZZZ GROUP COUNSELING FOR SUBSTANCE ABUSE TREATMENT, COGNITIVE-BEHAVIORAL: ICD-10-PCS | Performed by: ALLERGY & IMMUNOLOGY

## 2021-03-24 PROCEDURE — U0003 INFECTIOUS AGENT DETECTION BY NUCLEIC ACID (DNA OR RNA); SEVERE ACUTE RESPIRATORY SYNDROME CORONAVIRUS 2 (SARS-COV-2) (CORONAVIRUS DISEASE [COVID-19]), AMPLIFIED PROBE TECHNIQUE, MAKING USE OF HIGH THROUGHPUT TECHNOLOGIES AS DESCRIBED BY CMS-2020-01-R: HCPCS

## 2021-03-24 RX ADMIN — Medication SCH: at 23:50

## 2021-03-24 RX ADMIN — HYDROXYZINE PAMOATE SCH: 25 CAPSULE ORAL at 23:50

## 2021-03-24 RX ADMIN — HYDROXYZINE PAMOATE SCH: 25 CAPSULE ORAL at 17:38

## 2021-03-24 RX ADMIN — HYDRALAZINE HYDROCHLORIDE SCH: 25 TABLET, FILM COATED ORAL at 23:50

## 2021-03-24 RX ADMIN — ATENOLOL SCH: 25 TABLET ORAL at 23:50

## 2021-03-25 VITALS — HEART RATE: 102 BPM | DIASTOLIC BLOOD PRESSURE: 70 MMHG | SYSTOLIC BLOOD PRESSURE: 119 MMHG

## 2021-03-25 LAB
ALBUMIN SERPL-MCNC: 4 G/DL (ref 3.4–5)
ALP SERPL-CCNC: 132 U/L (ref 45–117)
ALT SERPL-CCNC: 19 U/L (ref 13–61)
ANION GAP SERPL CALC-SCNC: 6 MMOL/L (ref 8–16)
AST SERPL-CCNC: 10 U/L (ref 15–37)
BILIRUB SERPL-MCNC: 1.2 MG/DL (ref 0.2–1)
BUN SERPL-MCNC: 6.7 MG/DL (ref 7–18)
CALCIUM SERPL-MCNC: 9.1 MG/DL (ref 8.5–10.1)
CHLORIDE SERPL-SCNC: 100 MMOL/L (ref 98–107)
CO2 SERPL-SCNC: 26 MMOL/L (ref 21–32)
CREAT SERPL-MCNC: 0.7 MG/DL (ref 0.55–1.3)
DEPRECATED RDW RBC AUTO: 14.4 % (ref 11.6–15.6)
GLUCOSE SERPL-MCNC: 114 MG/DL (ref 74–106)
HCT VFR BLD CALC: 42.2 % (ref 32.4–45.2)
HGB BLD-MCNC: 14.1 GM/DL (ref 10.7–15.3)
MCH RBC QN AUTO: 28.2 PG (ref 25.7–33.7)
MCHC RBC AUTO-ENTMCNC: 33.5 G/DL (ref 32–36)
MCV RBC: 84.3 FL (ref 80–96)
PLATELET # BLD AUTO: 316 K/MM3 (ref 134–434)
PMV BLD: 9 FL (ref 7.5–11.1)
POTASSIUM SERPLBLD-SCNC: 4.3 MMOL/L (ref 3.5–5.1)
PROT SERPL-MCNC: 7.2 G/DL (ref 6.4–8.2)
RBC # BLD AUTO: 5.01 M/MM3 (ref 3.6–5.2)
SODIUM SERPL-SCNC: 132 MMOL/L (ref 136–145)
WBC # BLD AUTO: 6.7 K/MM3 (ref 4–10)

## 2021-03-25 RX ADMIN — HYDRALAZINE HYDROCHLORIDE SCH: 25 TABLET, FILM COATED ORAL at 21:31

## 2021-03-25 RX ADMIN — Medication SCH: at 21:32

## 2021-03-25 RX ADMIN — ATENOLOL SCH MG: 25 TABLET ORAL at 10:54

## 2021-03-25 RX ADMIN — ATENOLOL SCH: 25 TABLET ORAL at 21:32

## 2021-03-25 RX ADMIN — HYDROXYZINE PAMOATE SCH MG: 25 CAPSULE ORAL at 13:13

## 2021-03-25 RX ADMIN — HYDRALAZINE HYDROCHLORIDE SCH: 25 TABLET, FILM COATED ORAL at 07:50

## 2021-03-25 RX ADMIN — HYDROXYZINE PAMOATE SCH: 25 CAPSULE ORAL at 07:50

## 2021-03-25 RX ADMIN — HYDROXYZINE PAMOATE SCH MG: 25 CAPSULE ORAL at 10:54

## 2021-03-25 RX ADMIN — HYDROXYZINE PAMOATE SCH: 25 CAPSULE ORAL at 19:20

## 2021-03-25 RX ADMIN — HYDRALAZINE HYDROCHLORIDE SCH MG: 25 TABLET, FILM COATED ORAL at 13:13

## 2021-03-25 RX ADMIN — HYDROXYZINE PAMOATE SCH: 25 CAPSULE ORAL at 21:32

## 2023-02-06 ENCOUNTER — HOSPITAL ENCOUNTER (INPATIENT)
Dept: HOSPITAL 74 - YASAS | Age: 28
LOS: 2 days | Discharge: LEFT BEFORE DISCHARGE | DRG: 770 | End: 2023-02-08
Attending: SURGERY | Admitting: ALLERGY & IMMUNOLOGY
Payer: COMMERCIAL

## 2023-02-06 VITALS — BODY MASS INDEX: 25.7 KG/M2

## 2023-02-06 DIAGNOSIS — F41.9: ICD-10-CM

## 2023-02-06 DIAGNOSIS — Z87.891: ICD-10-CM

## 2023-02-06 DIAGNOSIS — F11.23: Primary | ICD-10-CM

## 2023-02-06 DIAGNOSIS — J45.909: ICD-10-CM

## 2023-02-06 DIAGNOSIS — Z89.612: ICD-10-CM

## 2023-02-06 DIAGNOSIS — F25.9: ICD-10-CM

## 2023-02-06 DIAGNOSIS — Z99.89: ICD-10-CM

## 2023-02-06 DIAGNOSIS — I10: ICD-10-CM

## 2023-02-06 DIAGNOSIS — Z86.59: ICD-10-CM

## 2023-02-06 DIAGNOSIS — F32.A: ICD-10-CM

## 2023-02-06 PROCEDURE — U0003 INFECTIOUS AGENT DETECTION BY NUCLEIC ACID (DNA OR RNA); SEVERE ACUTE RESPIRATORY SYNDROME CORONAVIRUS 2 (SARS-COV-2) (CORONAVIRUS DISEASE [COVID-19]), AMPLIFIED PROBE TECHNIQUE, MAKING USE OF HIGH THROUGHPUT TECHNOLOGIES AS DESCRIBED BY CMS-2020-01-R: HCPCS

## 2023-02-06 PROCEDURE — HZ2ZZZZ DETOXIFICATION SERVICES FOR SUBSTANCE ABUSE TREATMENT: ICD-10-PCS | Performed by: SURGERY

## 2023-02-06 PROCEDURE — U0005 INFEC AGEN DETEC AMPLI PROBE: HCPCS

## 2023-02-06 RX ADMIN — METHOCARBAMOL PRN MG: 500 TABLET ORAL at 23:01

## 2023-02-06 RX ADMIN — Medication PRN MG: at 22:53

## 2023-02-06 RX ADMIN — Medication SCH MG: at 22:42

## 2023-02-06 RX ADMIN — Medication PRN MG: at 22:42

## 2023-02-07 LAB
ALBUMIN SERPL-MCNC: 3.1 G/DL (ref 3.4–5)
ALP SERPL-CCNC: 139 U/L (ref 45–117)
ALT SERPL-CCNC: 14 U/L (ref 13–61)
ANION GAP SERPL CALC-SCNC: 6 MMOL/L (ref 8–16)
AST SERPL-CCNC: 13 U/L (ref 15–37)
BILIRUB SERPL-MCNC: 0.2 MG/DL (ref 0.2–1)
BUN SERPL-MCNC: 8.5 MG/DL (ref 7–18)
CALCIUM SERPL-MCNC: 8.6 MG/DL (ref 8.5–10.1)
CHLORIDE SERPL-SCNC: 104 MMOL/L (ref 98–107)
CO2 SERPL-SCNC: 27 MMOL/L (ref 21–32)
CREAT SERPL-MCNC: 0.5 MG/DL (ref 0.55–1.3)
DEPRECATED RDW RBC AUTO: 14.7 % (ref 11.6–15.6)
GLUCOSE SERPL-MCNC: 106 MG/DL (ref 74–106)
HCT VFR BLD CALC: 36 % (ref 32.4–45.2)
HGB BLD-MCNC: 12 GM/DL (ref 10.7–15.3)
MCH RBC QN AUTO: 27.5 PG (ref 25.7–33.7)
MCHC RBC AUTO-ENTMCNC: 33.3 G/DL (ref 32–36)
MCV RBC: 82.8 FL (ref 80–96)
PLATELET # BLD AUTO: 303 10^3/UL (ref 134–434)
PMV BLD: 8.6 FL (ref 7.5–11.1)
PROT SERPL-MCNC: 6.2 G/DL (ref 6.4–8.2)
RBC # BLD AUTO: 4.35 M/MM3 (ref 3.6–5.2)
SODIUM SERPL-SCNC: 137 MMOL/L (ref 136–145)
WBC # BLD AUTO: 5.3 K/MM3 (ref 4–10)

## 2023-02-07 RX ADMIN — HYDROXYZINE PAMOATE PRN MG: 25 CAPSULE ORAL at 11:43

## 2023-02-07 RX ADMIN — Medication SCH TAB: at 11:42

## 2023-02-07 RX ADMIN — HYDRALAZINE HYDROCHLORIDE SCH: 25 TABLET, FILM COATED ORAL at 14:39

## 2023-02-07 RX ADMIN — Medication SCH MG: at 22:15

## 2023-02-07 RX ADMIN — METHOCARBAMOL PRN MG: 500 TABLET ORAL at 11:42

## 2023-02-07 RX ADMIN — ATENOLOL SCH MG: 25 TABLET ORAL at 22:15

## 2023-02-07 RX ADMIN — AMLODIPINE BESYLATE SCH MG: 10 TABLET ORAL at 14:23

## 2023-02-07 RX ADMIN — Medication PRN MG: at 22:15

## 2023-02-07 RX ADMIN — ATENOLOL SCH: 25 TABLET ORAL at 14:39

## 2023-02-07 RX ADMIN — LISINOPRIL SCH MG: 20 TABLET ORAL at 14:23

## 2023-02-07 RX ADMIN — HYDRALAZINE HYDROCHLORIDE SCH MG: 25 TABLET, FILM COATED ORAL at 22:15

## 2023-02-08 VITALS
RESPIRATION RATE: 18 BRPM | TEMPERATURE: 97.7 F | HEART RATE: 89 BPM | SYSTOLIC BLOOD PRESSURE: 124 MMHG | DIASTOLIC BLOOD PRESSURE: 63 MMHG

## 2023-02-08 RX ADMIN — ATENOLOL SCH: 25 TABLET ORAL at 10:59

## 2023-02-08 RX ADMIN — HYDROXYZINE PAMOATE PRN MG: 25 CAPSULE ORAL at 10:54

## 2023-02-08 RX ADMIN — AMLODIPINE BESYLATE SCH: 10 TABLET ORAL at 10:56

## 2023-02-08 RX ADMIN — LISINOPRIL SCH: 20 TABLET ORAL at 10:56

## 2023-02-08 RX ADMIN — METHOCARBAMOL PRN MG: 500 TABLET ORAL at 10:55

## 2023-02-08 RX ADMIN — HYDRALAZINE HYDROCHLORIDE SCH: 25 TABLET, FILM COATED ORAL at 05:25

## 2023-02-08 RX ADMIN — HYDRALAZINE HYDROCHLORIDE SCH: 25 TABLET, FILM COATED ORAL at 14:28

## 2023-02-08 RX ADMIN — Medication SCH TAB: at 10:55

## 2023-06-13 ENCOUNTER — HOSPITAL ENCOUNTER (INPATIENT)
Dept: HOSPITAL 74 - YASAS | Age: 28
LOS: 4 days | Discharge: HOME | DRG: 773 | End: 2023-06-17
Attending: SURGERY | Admitting: ALLERGY & IMMUNOLOGY
Payer: COMMERCIAL

## 2023-06-13 VITALS — BODY MASS INDEX: 24 KG/M2

## 2023-06-13 DIAGNOSIS — F19.280: ICD-10-CM

## 2023-06-13 DIAGNOSIS — R76.11: ICD-10-CM

## 2023-06-13 DIAGNOSIS — J45.20: ICD-10-CM

## 2023-06-13 DIAGNOSIS — F10.230: ICD-10-CM

## 2023-06-13 DIAGNOSIS — I10: ICD-10-CM

## 2023-06-13 DIAGNOSIS — F11.23: Primary | ICD-10-CM

## 2023-06-13 DIAGNOSIS — F31.9: ICD-10-CM

## 2023-06-13 DIAGNOSIS — F25.9: ICD-10-CM

## 2023-06-13 DIAGNOSIS — Z99.89: ICD-10-CM

## 2023-06-13 DIAGNOSIS — Z89.612: ICD-10-CM

## 2023-06-13 PROCEDURE — HZ2ZZZZ DETOXIFICATION SERVICES FOR SUBSTANCE ABUSE TREATMENT: ICD-10-PCS | Performed by: SURGERY

## 2023-06-13 RX ADMIN — Medication SCH MG: at 21:51

## 2023-06-13 RX ADMIN — NICOTINE PRN MG: 4 INHALANT RESPIRATORY (INHALATION) at 22:45

## 2023-06-14 RX ADMIN — SUVOREXANT PRN MG: 10 TABLET, FILM COATED ORAL at 22:05

## 2023-06-14 RX ADMIN — Medication SCH TAB: at 10:07

## 2023-06-14 RX ADMIN — Medication SCH MG: at 22:04

## 2023-06-14 RX ADMIN — ATENOLOL SCH MG: 25 TABLET ORAL at 10:41

## 2023-06-14 RX ADMIN — ATENOLOL SCH MG: 25 TABLET ORAL at 22:04

## 2023-06-14 RX ADMIN — NICOTINE PRN MG: 4 INHALANT RESPIRATORY (INHALATION) at 15:59

## 2023-06-14 RX ADMIN — METHOCARBAMOL PRN MG: 500 TABLET ORAL at 22:09

## 2023-06-14 RX ADMIN — AMLODIPINE BESYLATE SCH MG: 10 TABLET ORAL at 10:09

## 2023-06-14 RX ADMIN — METHOCARBAMOL PRN MG: 500 TABLET ORAL at 13:21

## 2023-06-14 RX ADMIN — HYDRALAZINE HYDROCHLORIDE SCH MG: 25 TABLET, FILM COATED ORAL at 22:04

## 2023-06-14 RX ADMIN — FAMOTIDINE SCH MG: 20 TABLET ORAL at 10:09

## 2023-06-14 RX ADMIN — HYDRALAZINE HYDROCHLORIDE SCH MG: 25 TABLET, FILM COATED ORAL at 13:18

## 2023-06-14 RX ADMIN — NICOTINE PRN MG: 4 INHALANT RESPIRATORY (INHALATION) at 22:10

## 2023-06-14 RX ADMIN — NICOTINE PRN MG: 4 INHALANT RESPIRATORY (INHALATION) at 11:25

## 2023-06-14 RX ADMIN — METHOCARBAMOL PRN MG: 500 TABLET ORAL at 05:45

## 2023-06-14 RX ADMIN — NICOTINE SCH MG: 14 PATCH, EXTENDED RELEASE TRANSDERMAL at 10:09

## 2023-06-14 RX ADMIN — LISINOPRIL SCH MG: 20 TABLET ORAL at 10:08

## 2023-06-15 LAB
ALBUMIN SERPL-MCNC: 3.6 G/DL (ref 3.4–5)
ALP SERPL-CCNC: 117 U/L (ref 45–117)
ALT SERPL-CCNC: 16 U/L (ref 13–61)
ANION GAP SERPL CALC-SCNC: 11 MMOL/L (ref 8–16)
AST SERPL-CCNC: 8 U/L (ref 15–37)
BILIRUB SERPL-MCNC: 0.9 MG/DL (ref 0.2–1)
BUN SERPL-MCNC: 11.7 MG/DL (ref 7–18)
CALCIUM SERPL-MCNC: 9 MG/DL (ref 8.5–10.1)
CHLORIDE SERPL-SCNC: 105 MMOL/L (ref 98–107)
CO2 SERPL-SCNC: 24 MMOL/L (ref 21–32)
CREAT SERPL-MCNC: 0.5 MG/DL (ref 0.55–1.3)
DEPRECATED RDW RBC AUTO: 15.1 % (ref 11.6–15.6)
GLUCOSE SERPL-MCNC: 112 MG/DL (ref 74–106)
HCT VFR BLD CALC: 37.7 % (ref 32.4–45.2)
HGB BLD-MCNC: 12.7 GM/DL (ref 10.7–15.3)
MCH RBC QN AUTO: 27 PG (ref 25.7–33.7)
MCHC RBC AUTO-ENTMCNC: 33.6 G/DL (ref 32–36)
MCV RBC: 80.5 FL (ref 80–96)
PLATELET # BLD AUTO: 311 10^3/UL (ref 134–434)
PMV BLD: 9 FL (ref 7.5–11.1)
POTASSIUM SERPLBLD-SCNC: 3.9 MMOL/L (ref 3.5–5.1)
PROT SERPL-MCNC: 6.7 G/DL (ref 6.4–8.2)
RBC # BLD AUTO: 4.68 M/MM3 (ref 3.6–5.2)
SODIUM SERPL-SCNC: 141 MMOL/L (ref 136–145)
WBC # BLD AUTO: 6.3 K/MM3 (ref 4–10)

## 2023-06-15 RX ADMIN — NICOTINE SCH: 14 PATCH, EXTENDED RELEASE TRANSDERMAL at 10:27

## 2023-06-15 RX ADMIN — NICOTINE PRN MG: 4 INHALANT RESPIRATORY (INHALATION) at 16:30

## 2023-06-15 RX ADMIN — FAMOTIDINE SCH MG: 20 TABLET ORAL at 10:25

## 2023-06-15 RX ADMIN — METHOCARBAMOL PRN MG: 500 TABLET ORAL at 17:31

## 2023-06-15 RX ADMIN — LISINOPRIL SCH: 20 TABLET ORAL at 10:26

## 2023-06-15 RX ADMIN — SUVOREXANT PRN MG: 10 TABLET, FILM COATED ORAL at 22:23

## 2023-06-15 RX ADMIN — NICOTINE PRN MG: 4 INHALANT RESPIRATORY (INHALATION) at 02:41

## 2023-06-15 RX ADMIN — ATENOLOL SCH: 25 TABLET ORAL at 10:27

## 2023-06-15 RX ADMIN — NICOTINE PRN MG: 4 INHALANT RESPIRATORY (INHALATION) at 10:26

## 2023-06-15 RX ADMIN — AMLODIPINE BESYLATE SCH: 10 TABLET ORAL at 10:27

## 2023-06-15 RX ADMIN — METHOCARBAMOL PRN MG: 500 TABLET ORAL at 23:28

## 2023-06-15 RX ADMIN — HYDRALAZINE HYDROCHLORIDE SCH: 25 TABLET, FILM COATED ORAL at 05:55

## 2023-06-15 RX ADMIN — Medication SCH MG: at 22:22

## 2023-06-15 RX ADMIN — ATENOLOL SCH MG: 25 TABLET ORAL at 22:22

## 2023-06-15 RX ADMIN — NICOTINE PRN MG: 4 INHALANT RESPIRATORY (INHALATION) at 23:29

## 2023-06-15 RX ADMIN — Medication SCH TAB: at 10:25

## 2023-06-16 RX ADMIN — METHOCARBAMOL PRN MG: 500 TABLET ORAL at 10:14

## 2023-06-16 RX ADMIN — NICOTINE PRN MG: 4 INHALANT RESPIRATORY (INHALATION) at 17:31

## 2023-06-16 RX ADMIN — NICOTINE PRN MG: 4 INHALANT RESPIRATORY (INHALATION) at 13:18

## 2023-06-16 RX ADMIN — NICOTINE SCH: 14 PATCH, EXTENDED RELEASE TRANSDERMAL at 10:14

## 2023-06-16 RX ADMIN — AMLODIPINE BESYLATE SCH MG: 10 TABLET ORAL at 10:12

## 2023-06-16 RX ADMIN — Medication SCH MG: at 22:14

## 2023-06-16 RX ADMIN — ATENOLOL SCH MG: 25 TABLET ORAL at 22:14

## 2023-06-16 RX ADMIN — ATENOLOL SCH MG: 25 TABLET ORAL at 10:14

## 2023-06-16 RX ADMIN — Medication SCH TAB: at 10:12

## 2023-06-16 RX ADMIN — NICOTINE PRN MG: 4 INHALANT RESPIRATORY (INHALATION) at 22:52

## 2023-06-16 RX ADMIN — FAMOTIDINE SCH MG: 20 TABLET ORAL at 10:12

## 2023-06-16 RX ADMIN — SUVOREXANT PRN MG: 10 TABLET, FILM COATED ORAL at 22:14

## 2023-06-17 VITALS
HEART RATE: 68 BPM | DIASTOLIC BLOOD PRESSURE: 77 MMHG | SYSTOLIC BLOOD PRESSURE: 120 MMHG | TEMPERATURE: 97.3 F | RESPIRATION RATE: 17 BRPM

## 2023-06-17 RX ADMIN — ATENOLOL SCH MG: 25 TABLET ORAL at 10:26

## 2023-06-17 RX ADMIN — AMLODIPINE BESYLATE SCH MG: 10 TABLET ORAL at 10:26

## 2023-06-17 RX ADMIN — Medication SCH TAB: at 10:26

## 2023-06-17 RX ADMIN — NICOTINE SCH: 14 PATCH, EXTENDED RELEASE TRANSDERMAL at 10:30

## 2023-06-17 RX ADMIN — METHOCARBAMOL PRN MG: 500 TABLET ORAL at 10:27

## 2023-06-17 RX ADMIN — FAMOTIDINE SCH MG: 20 TABLET ORAL at 10:26
